# Patient Record
Sex: MALE | Race: WHITE | NOT HISPANIC OR LATINO | URBAN - METROPOLITAN AREA
[De-identification: names, ages, dates, MRNs, and addresses within clinical notes are randomized per-mention and may not be internally consistent; named-entity substitution may affect disease eponyms.]

---

## 2017-08-04 ENCOUNTER — EMERGENCY (EMERGENCY)
Facility: HOSPITAL | Age: 32
LOS: 1 days | Discharge: HOME | End: 2017-08-04
Attending: EMERGENCY MEDICINE | Admitting: EMERGENCY MEDICINE
Payer: COMMERCIAL

## 2017-08-04 VITALS
SYSTOLIC BLOOD PRESSURE: 142 MMHG | RESPIRATION RATE: 20 BRPM | HEIGHT: 75 IN | TEMPERATURE: 99 F | HEART RATE: 75 BPM | WEIGHT: 274.92 LBS | OXYGEN SATURATION: 97 % | DIASTOLIC BLOOD PRESSURE: 100 MMHG

## 2017-08-04 VITALS
TEMPERATURE: 98 F | HEART RATE: 82 BPM | RESPIRATION RATE: 16 BRPM | OXYGEN SATURATION: 98 % | DIASTOLIC BLOOD PRESSURE: 70 MMHG | SYSTOLIC BLOOD PRESSURE: 138 MMHG

## 2017-08-04 LAB
ALBUMIN SERPL ELPH-MCNC: 4.4 G/DL — SIGNIFICANT CHANGE UP (ref 3.3–5.2)
ALP SERPL-CCNC: 72 U/L — SIGNIFICANT CHANGE UP (ref 40–120)
ALT FLD-CCNC: 27 U/L — SIGNIFICANT CHANGE UP
ANION GAP SERPL CALC-SCNC: 14 MMOL/L — SIGNIFICANT CHANGE UP (ref 5–17)
APPEARANCE UR: CLEAR — SIGNIFICANT CHANGE UP
AST SERPL-CCNC: 21 U/L — SIGNIFICANT CHANGE UP
BASOPHILS # BLD AUTO: 0 K/UL — SIGNIFICANT CHANGE UP (ref 0–0.2)
BASOPHILS NFR BLD AUTO: 0.2 % — SIGNIFICANT CHANGE UP (ref 0–2)
BILIRUB SERPL-MCNC: 0.4 MG/DL — SIGNIFICANT CHANGE UP (ref 0.4–2)
BILIRUB UR-MCNC: NEGATIVE — SIGNIFICANT CHANGE UP
BUN SERPL-MCNC: 15 MG/DL — SIGNIFICANT CHANGE UP (ref 8–20)
CALCIUM SERPL-MCNC: 9 MG/DL — SIGNIFICANT CHANGE UP (ref 8.6–10.2)
CHLORIDE SERPL-SCNC: 101 MMOL/L — SIGNIFICANT CHANGE UP (ref 98–107)
CO2 SERPL-SCNC: 27 MMOL/L — SIGNIFICANT CHANGE UP (ref 22–29)
COLOR SPEC: YELLOW — SIGNIFICANT CHANGE UP
CREAT SERPL-MCNC: 1 MG/DL — SIGNIFICANT CHANGE UP (ref 0.5–1.3)
DIFF PNL FLD: ABNORMAL
EOSINOPHIL # BLD AUTO: 0.1 K/UL — SIGNIFICANT CHANGE UP (ref 0–0.5)
EOSINOPHIL NFR BLD AUTO: 0.5 % — SIGNIFICANT CHANGE UP (ref 0–5)
EPI CELLS # UR: SIGNIFICANT CHANGE UP
GLUCOSE SERPL-MCNC: 136 MG/DL — HIGH (ref 70–115)
GLUCOSE UR QL: NEGATIVE MG/DL — SIGNIFICANT CHANGE UP
HCT VFR BLD CALC: 44.4 % — SIGNIFICANT CHANGE UP (ref 42–52)
HGB BLD-MCNC: 15.8 G/DL — SIGNIFICANT CHANGE UP (ref 14–18)
KETONES UR-MCNC: NEGATIVE — SIGNIFICANT CHANGE UP
LEUKOCYTE ESTERASE UR-ACNC: ABNORMAL
LYMPHOCYTES # BLD AUTO: 1.6 K/UL — SIGNIFICANT CHANGE UP (ref 1–4.8)
LYMPHOCYTES # BLD AUTO: 12.7 % — LOW (ref 20–55)
MCHC RBC-ENTMCNC: 31.3 PG — HIGH (ref 27–31)
MCHC RBC-ENTMCNC: 35.6 G/DL — SIGNIFICANT CHANGE UP (ref 32–36)
MCV RBC AUTO: 88.1 FL — SIGNIFICANT CHANGE UP (ref 80–94)
MONOCYTES # BLD AUTO: 0.6 K/UL — SIGNIFICANT CHANGE UP (ref 0–0.8)
MONOCYTES NFR BLD AUTO: 5.2 % — SIGNIFICANT CHANGE UP (ref 3–10)
NEUTROPHILS # BLD AUTO: 9.9 K/UL — HIGH (ref 1.8–8)
NEUTROPHILS NFR BLD AUTO: 81.1 % — HIGH (ref 37–73)
NITRITE UR-MCNC: NEGATIVE — SIGNIFICANT CHANGE UP
PH UR: 5 — SIGNIFICANT CHANGE UP (ref 5–8)
PLATELET # BLD AUTO: 183 K/UL — SIGNIFICANT CHANGE UP (ref 150–400)
POTASSIUM SERPL-MCNC: 3.8 MMOL/L — SIGNIFICANT CHANGE UP (ref 3.5–5.3)
POTASSIUM SERPL-SCNC: 3.8 MMOL/L — SIGNIFICANT CHANGE UP (ref 3.5–5.3)
PROT SERPL-MCNC: 7.4 G/DL — SIGNIFICANT CHANGE UP (ref 6.6–8.7)
PROT UR-MCNC: 30 MG/DL
RBC # BLD: 5.04 M/UL — SIGNIFICANT CHANGE UP (ref 4.6–6.2)
RBC # FLD: 12 % — SIGNIFICANT CHANGE UP (ref 11–15.6)
RBC CASTS # UR COMP ASSIST: ABNORMAL /HPF (ref 0–4)
SODIUM SERPL-SCNC: 142 MMOL/L — SIGNIFICANT CHANGE UP (ref 135–145)
SP GR SPEC: 1.03 — HIGH (ref 1.01–1.02)
UROBILINOGEN FLD QL: NEGATIVE MG/DL — SIGNIFICANT CHANGE UP
WBC # BLD: 12.2 K/UL — HIGH (ref 4.8–10.8)
WBC # FLD AUTO: 12.2 K/UL — HIGH (ref 4.8–10.8)
WBC UR QL: SIGNIFICANT CHANGE UP

## 2017-08-04 PROCEDURE — 96375 TX/PRO/DX INJ NEW DRUG ADDON: CPT

## 2017-08-04 PROCEDURE — 96374 THER/PROPH/DIAG INJ IV PUSH: CPT

## 2017-08-04 PROCEDURE — 36415 COLL VENOUS BLD VENIPUNCTURE: CPT

## 2017-08-04 PROCEDURE — 74176 CT ABD & PELVIS W/O CONTRAST: CPT

## 2017-08-04 PROCEDURE — 99284 EMERGENCY DEPT VISIT MOD MDM: CPT | Mod: 25

## 2017-08-04 PROCEDURE — 80053 COMPREHEN METABOLIC PANEL: CPT

## 2017-08-04 PROCEDURE — 81001 URINALYSIS AUTO W/SCOPE: CPT

## 2017-08-04 PROCEDURE — 74176 CT ABD & PELVIS W/O CONTRAST: CPT | Mod: 26

## 2017-08-04 PROCEDURE — 85027 COMPLETE CBC AUTOMATED: CPT

## 2017-08-04 RX ORDER — TAMSULOSIN HYDROCHLORIDE 0.4 MG/1
0.4 CAPSULE ORAL ONCE
Qty: 0 | Refills: 0 | Status: COMPLETED | OUTPATIENT
Start: 2017-08-04 | End: 2017-08-04

## 2017-08-04 RX ORDER — ONDANSETRON 8 MG/1
4 TABLET, FILM COATED ORAL ONCE
Qty: 0 | Refills: 0 | Status: COMPLETED | OUTPATIENT
Start: 2017-08-04 | End: 2017-08-04

## 2017-08-04 RX ORDER — IBUPROFEN 200 MG
1 TABLET ORAL
Qty: 15 | Refills: 0 | OUTPATIENT
Start: 2017-08-04 | End: 2017-08-09

## 2017-08-04 RX ORDER — TAMSULOSIN HYDROCHLORIDE 0.4 MG/1
1 CAPSULE ORAL
Qty: 14 | Refills: 0 | OUTPATIENT
Start: 2017-08-04 | End: 2017-08-18

## 2017-08-04 RX ORDER — ONDANSETRON 8 MG/1
1 TABLET, FILM COATED ORAL
Qty: 15 | Refills: 0 | OUTPATIENT
Start: 2017-08-04 | End: 2017-08-09

## 2017-08-04 RX ORDER — KETOROLAC TROMETHAMINE 30 MG/ML
15 SYRINGE (ML) INJECTION ONCE
Qty: 0 | Refills: 0 | Status: DISCONTINUED | OUTPATIENT
Start: 2017-08-04 | End: 2017-08-04

## 2017-08-04 RX ORDER — SODIUM CHLORIDE 9 MG/ML
1000 INJECTION INTRAMUSCULAR; INTRAVENOUS; SUBCUTANEOUS ONCE
Qty: 0 | Refills: 0 | Status: COMPLETED | OUTPATIENT
Start: 2017-08-04 | End: 2017-08-04

## 2017-08-04 RX ADMIN — ONDANSETRON 4 MILLIGRAM(S): 8 TABLET, FILM COATED ORAL at 07:40

## 2017-08-04 RX ADMIN — TAMSULOSIN HYDROCHLORIDE 0.4 MILLIGRAM(S): 0.4 CAPSULE ORAL at 09:58

## 2017-08-04 RX ADMIN — Medication 15 MILLIGRAM(S): at 07:40

## 2017-08-04 RX ADMIN — SODIUM CHLORIDE 1000 MILLILITER(S): 9 INJECTION INTRAMUSCULAR; INTRAVENOUS; SUBCUTANEOUS at 07:40

## 2017-08-04 RX ADMIN — Medication 15 MILLIGRAM(S): at 07:55

## 2017-08-04 NOTE — ED STATDOCS - PROGRESS NOTE DETAILS
patient re-evaluated c/o R sided back pain x 1 day, states woke up from pain unable to sleep, denies any n/v/d or any fevers or chills, or hematuria, he reports felt as thought needed to have BM but was unable to, PE: +R CVAT, no masses or hernias, +BS x4, no surgical scars, pending lab/UA and CT results, will re-eval patient re-evaluated c/o R sided back pain x 1 day, states woke up from pain unable to sleep, denies any n/v/d or any fevers or chills, or hematuria, he reports felt as thought needed to have BM but was unable to, PE: +R CVAT, no masses or hernias, +BS x4, no surgical scars, pending lab/UA and CT results, will re-eval.  Patient works as tomb stone maker, uses "crane for heavy lifting" reports back pain does not feel similar to what he is experiencing now. CBC shows leukocytosis and UA shows large blood, pending CT CT shows 2mm stone, will give uro f/u and NSAIDs and flomax

## 2017-08-04 NOTE — ED STATDOCS - ATTENDING CONTRIBUTION TO CARE
I, Mart Fisher, performed the initial face to face bedside interview with this patient regarding history of present illness, review of symptoms and relevant past medical, social and family history.  I completed an independent physical examination.  I was the provider who initially evaluated this patient.  The history, relevant review of systems, past medical and surgical history, medical decision making, and physical examination was documented by the scribe in my presence and I attest to the accuracy of the documentation. Follow-up on ordered tests (ie labs, radiologic studies) and re-evaluation of the patient's status has been communicated to the ACP.  Disposition of the patient will be based on test outcome and response to ED interventions.

## 2017-08-04 NOTE — ED ADULT NURSE NOTE - OBJECTIVE STATEMENT
33 y/o male presents to ed reporting acute onset of right flank pain that started this AM at 515; awoke patient from sleep. Patient reporting right flank pain 10/10 at this time; pacing; unable to sit still. Patient denies blood in urine but blood is tea color in appearance. Patient denies any nausea/vomiting/difficulty urinating. Moves all extremities without difficulty. #20 placed left ac labs drawn and sent. Urine sent.

## 2017-08-04 NOTE — ED STATDOCS - OBJECTIVE STATEMENT
33 y/o M presents to ED c/o lower R sided flank pain since 05:00 today. Associated sx include urgency to move bowels and vomiting (1 episode). Pt notes that the pain has been increasing since onset which prompted his visit to the ED. He reports no hx of similar sx. Denies urinary urgency, testicular pain, or any other complaints at this time. Pt is allergic to Topamax.

## 2017-08-04 NOTE — ED ADULT NURSE NOTE - CAS EDN DISCHARGE ASSESSMENT
Awake/Patient baseline mental status/Alert and oriented to person, place and time/No adverse reaction to first time med in ED

## 2017-11-27 NOTE — ED STATDOCS - CHPI ED AREA
INR is sub-therapeutic. Patient reports one missed dose. Will boost tonight's dose only then re-challenge dose and diet until follow-up.    lower

## 2019-06-18 NOTE — ED STATDOCS - NS PRO PASSIVE SMOKE EXP
Detail Level: Detailed Quality 474: Zoster Vaccination Status: Shingrix Vaccination not Administered or Previously Received, Reason not Otherwise Specified Quality 131: Pain Assessment And Follow-Up: Pain assessment using a standardized tool is documented as negative, no follow-up plan required Quality 130: Documentation Of Current Medications In The Medical Record: Current Medications Documented Unknown

## 2021-12-08 ENCOUNTER — NON-APPOINTMENT (OUTPATIENT)
Age: 36
End: 2021-12-08

## 2021-12-09 ENCOUNTER — APPOINTMENT (OUTPATIENT)
Dept: CARDIOLOGY | Facility: CLINIC | Age: 36
End: 2021-12-09
Payer: COMMERCIAL

## 2021-12-09 ENCOUNTER — NON-APPOINTMENT (OUTPATIENT)
Age: 36
End: 2021-12-09

## 2021-12-09 VITALS
HEART RATE: 71 BPM | RESPIRATION RATE: 16 BRPM | WEIGHT: 304 LBS | DIASTOLIC BLOOD PRESSURE: 88 MMHG | SYSTOLIC BLOOD PRESSURE: 136 MMHG | BODY MASS INDEX: 37.8 KG/M2 | HEIGHT: 75 IN

## 2021-12-09 DIAGNOSIS — R06.02 SHORTNESS OF BREATH: ICD-10-CM

## 2021-12-09 DIAGNOSIS — R07.9 CHEST PAIN, UNSPECIFIED: ICD-10-CM

## 2021-12-09 DIAGNOSIS — F41.0 PANIC DISORDER [EPISODIC PAROXYSMAL ANXIETY]: ICD-10-CM

## 2021-12-09 DIAGNOSIS — Z87.898 PERSONAL HISTORY OF OTHER SPECIFIED CONDITIONS: ICD-10-CM

## 2021-12-09 DIAGNOSIS — R42 DIZZINESS AND GIDDINESS: ICD-10-CM

## 2021-12-09 DIAGNOSIS — Z72.3 LACK OF PHYSICAL EXERCISE: ICD-10-CM

## 2021-12-09 DIAGNOSIS — Z78.9 OTHER SPECIFIED HEALTH STATUS: ICD-10-CM

## 2021-12-09 DIAGNOSIS — R55 SYNCOPE AND COLLAPSE: ICD-10-CM

## 2021-12-09 DIAGNOSIS — Z87.891 PERSONAL HISTORY OF NICOTINE DEPENDENCE: ICD-10-CM

## 2021-12-09 DIAGNOSIS — F41.9 ANXIETY DISORDER, UNSPECIFIED: ICD-10-CM

## 2021-12-09 PROCEDURE — 93000 ELECTROCARDIOGRAM COMPLETE: CPT

## 2021-12-09 PROCEDURE — 99204 OFFICE O/P NEW MOD 45 MIN: CPT

## 2021-12-09 NOTE — ASSESSMENT
[FreeTextEntry1] : EKG: Sinus rhythm with left anterior fascicular block.  Borderline T wave changes.\par \par 36-year-old male with no significant past medical history presents me for evaluation of recent chest discomfort.  The patient's chest discomforts have a lot of atypical features and given the patient's young age and lack of other very significant risk factors I believe are unlikely to represent a cardiovascular cause.  GI cause seems much more likely.  The initial episode likely was exacerbated by the pain and his anxiety surrounding the pain which caused the other symptoms.  I would do a cardiac evaluation to rule this out completely but overall a GI evaluation may be more helpful.  Review of his recent blood work does show an elevation in his LDL but not to the point that he would need treatment.  Certainly work with diet and exercise would be helpful.

## 2021-12-09 NOTE — DISCUSSION/SUMMARY
[FreeTextEntry1] : 1.  Check echocardiogram and exercise stress test to evaluate his chest pain and other symptoms.\par 2.  Check carotid Doppler given his episode of apparent presyncope.\par 3.  Consider GI evaluation.  I have instructed him to try taking a PPI for couple of weeks to see if this helps with his symptoms.\par 4.  Patient encouraged to work on diet to lose weight.  Patient encouraged to work on a healthy diet high in lean protein, whole grains and vegetables, and lower in white flour and simple sugars.\par 5.  Follow-up with his primary as well for his anxiety and possible GI discomfort.\par 6.  Follow up here after testing, and will make further recommendations at that time.

## 2021-12-09 NOTE — HISTORY OF PRESENT ILLNESS
[FreeTextEntry1] : Patient presents today having walked into the office because he was concerned about chest pain he has been having.  He called yesterday to make an appointment after seeing his primary doctor and was given an appointment next week.  However he became more concerned by the ongoing chest pain so he came for evaluation.  Approximately 4 days ago the patient had an episode of dizziness and shortness of breath which came on suddenly and was associated with pain in his chest.  He developed some hand cramping and lightheadedness and feels as though he almost passed out.  The pain then resolved and all the symptoms went away.  Since that time he has been having chest discomfort off and on lasting up to 3 hours at a time.  He has not had any other symptoms he had with the initial episode but just the test discomfort.  He describes it in the center of his chest and epigastrium.  It does not radiate.  There are no clear aggravating or relieving factors.  He does note that it happens at night when he sleeps as well.  There does not appear to be any exertional component.  He is able to do all of his normal physical exertion without symptoms or limitations.  Patient denies palpitations, orthopnea, syncope.

## 2021-12-14 ENCOUNTER — APPOINTMENT (OUTPATIENT)
Dept: CARDIOLOGY | Facility: CLINIC | Age: 36
End: 2021-12-14

## 2021-12-15 ENCOUNTER — APPOINTMENT (OUTPATIENT)
Dept: CARDIOLOGY | Facility: CLINIC | Age: 36
End: 2021-12-15
Payer: COMMERCIAL

## 2021-12-15 PROCEDURE — 93306 TTE W/DOPPLER COMPLETE: CPT

## 2021-12-15 PROCEDURE — 93880 EXTRACRANIAL BILAT STUDY: CPT

## 2022-01-20 ENCOUNTER — APPOINTMENT (OUTPATIENT)
Dept: CARDIOLOGY | Facility: CLINIC | Age: 37
End: 2022-01-20

## 2022-03-09 ENCOUNTER — APPOINTMENT (OUTPATIENT)
Dept: CARDIOLOGY | Facility: CLINIC | Age: 37
End: 2022-03-09

## 2024-12-29 ENCOUNTER — EMERGENCY (EMERGENCY)
Facility: HOSPITAL | Age: 39
LOS: 1 days | Discharge: HOME | End: 2024-12-29
Attending: EMERGENCY MEDICINE
Payer: COMMERCIAL

## 2024-12-29 VITALS
SYSTOLIC BLOOD PRESSURE: 126 MMHG | OXYGEN SATURATION: 96 % | WEIGHT: 313.06 LBS | HEART RATE: 99 BPM | TEMPERATURE: 99 F | RESPIRATION RATE: 18 BRPM | DIASTOLIC BLOOD PRESSURE: 83 MMHG

## 2024-12-29 LAB
APPEARANCE UR: CLEAR — SIGNIFICANT CHANGE UP
BACTERIA # UR AUTO: NEGATIVE /HPF — SIGNIFICANT CHANGE UP
BILIRUB UR-MCNC: NEGATIVE — SIGNIFICANT CHANGE UP
CAST: 0 /LPF — SIGNIFICANT CHANGE UP (ref 0–4)
COLOR SPEC: YELLOW — SIGNIFICANT CHANGE UP
DIFF PNL FLD: ABNORMAL
GLUCOSE UR QL: NEGATIVE MG/DL — SIGNIFICANT CHANGE UP
KETONES UR-MCNC: ABNORMAL MG/DL
LEUKOCYTE ESTERASE UR-ACNC: NEGATIVE — SIGNIFICANT CHANGE UP
NITRITE UR-MCNC: NEGATIVE — SIGNIFICANT CHANGE UP
PH UR: 6.5 — SIGNIFICANT CHANGE UP (ref 5–8)
PROT UR-MCNC: 30 MG/DL
RBC CASTS # UR COMP ASSIST: 4 /HPF — SIGNIFICANT CHANGE UP (ref 0–4)
SP GR SPEC: >1.03 — HIGH (ref 1–1.03)
SQUAMOUS # UR AUTO: 0 /HPF — SIGNIFICANT CHANGE UP (ref 0–5)
UROBILINOGEN FLD QL: 1 MG/DL — SIGNIFICANT CHANGE UP (ref 0.2–1)
WBC UR QL: 1 /HPF — SIGNIFICANT CHANGE UP (ref 0–5)

## 2024-12-29 RX ORDER — TAMSULOSIN HYDROCHLORIDE 0.4 MG/1
1 CAPSULE ORAL
Qty: 14 | Refills: 0
Start: 2024-12-29 | End: 2025-01-11

## 2024-12-30 LAB
CULTURE RESULTS: SIGNIFICANT CHANGE UP
SPECIMEN SOURCE: SIGNIFICANT CHANGE UP

## 2025-01-09 ENCOUNTER — APPOINTMENT (OUTPATIENT)
Dept: UROLOGY | Facility: CLINIC | Age: 40
End: 2025-01-09